# Patient Record
Sex: FEMALE | Race: WHITE
[De-identification: names, ages, dates, MRNs, and addresses within clinical notes are randomized per-mention and may not be internally consistent; named-entity substitution may affect disease eponyms.]

---

## 2023-03-06 ENCOUNTER — HOSPITAL ENCOUNTER (EMERGENCY)
Dept: HOSPITAL 33 - ED | Age: 28
Discharge: HOME | End: 2023-03-06
Payer: COMMERCIAL

## 2023-03-06 VITALS — DIASTOLIC BLOOD PRESSURE: 89 MMHG | SYSTOLIC BLOOD PRESSURE: 127 MMHG

## 2023-03-06 VITALS — HEART RATE: 75 BPM | OXYGEN SATURATION: 97 %

## 2023-03-06 DIAGNOSIS — R10.11: ICD-10-CM

## 2023-03-06 DIAGNOSIS — R11.0: ICD-10-CM

## 2023-03-06 DIAGNOSIS — R10.13: ICD-10-CM

## 2023-03-06 DIAGNOSIS — R79.89: ICD-10-CM

## 2023-03-06 DIAGNOSIS — K80.20: Primary | ICD-10-CM

## 2023-03-06 LAB
ALBUMIN SERPL-MCNC: 4.3 G/DL (ref 3.5–5)
ALP SERPL-CCNC: 408 U/L (ref 38–126)
ALT SERPL-CCNC: 608 U/L (ref 0–35)
AMYLASE SERPL-CCNC: 101 U/L (ref 30–110)
ANION GAP SERPL CALC-SCNC: 10.9 MEQ/L (ref 5–15)
AST SERPL QL: 964 U/L (ref 14–36)
BACTERIA UR CULT: NO
BASOPHILS # BLD AUTO: 0.02 X10^3/UL (ref 0–0.4)
BASOPHILS NFR BLD AUTO: 0.3 % (ref 0–0.4)
BILIRUB BLD-MCNC: 1.6 MG/DL (ref 0.2–1.3)
BUN SERPL-MCNC: 10 MG/DL (ref 7–17)
CALCIUM SPEC-MCNC: 9.2 MG/DL (ref 8.4–10.2)
CHLORIDE SERPL-SCNC: 107 MMOL/L (ref 98–107)
CO2 SERPL-SCNC: 27 MMOL/L (ref 22–30)
CREAT SERPL-MCNC: 0.84 MG/DL (ref 0.52–1.04)
EOSINOPHIL # BLD AUTO: 0.12 X10^3/UL (ref 0–0.5)
GFR SERPLBLD BASED ON 1.73 SQ M-ARVRAT: > 60 ML/MIN
GLUCOSE SERPL-MCNC: 99 MG/DL (ref 74–106)
HCT VFR BLD AUTO: 42.2 % (ref 35–47)
HGB BLD-MCNC: 13.7 G/DL (ref 12–16)
IMM GRANULOCYTES # BLD: 0.02 X10^3U/L (ref 0–0.03)
IMM GRANULOCYTES NFR BLD: 0.3 % (ref 0–0.4)
LIPASE SERPL-CCNC: 299 U/L (ref 23–300)
LYMPHOCYTES # SPEC AUTO: 1.47 X10^3/UL (ref 1–4.6)
MCH RBC QN AUTO: 28.5 PG (ref 26–32)
MCHC RBC AUTO-ENTMCNC: 32.5 G/DL (ref 32–36)
MONOCYTES # BLD AUTO: 0.55 X10^3/UL (ref 0–1.3)
NRBC # BLD AUTO: 0 X10^3U/L (ref 0–0.01)
NRBC BLD AUTO-RTO: 0 % (ref 0–0.1)
PLATELET # BLD AUTO: 226 X10^3/UL (ref 150–450)
POTASSIUM SERPLBLD-SCNC: 3.9 MMOL/L (ref 3.5–5.1)
PROT SERPL-MCNC: 7.2 G/DL (ref 6.3–8.2)
RBC # BLD AUTO: 4.81 X10^6/UL (ref 4.1–5.4)
RBC # URNS HPF: (no result) /HPF (ref 0–5)
SODIUM SERPL-SCNC: 141 MMOL/L (ref 137–145)
WBC # BLD AUTO: 7.1 X10^3/UL (ref 4–10.5)
WBC URNS QL MICRO: (no result) /HPF (ref 0–5)

## 2023-03-06 PROCEDURE — 76705 ECHO EXAM OF ABDOMEN: CPT

## 2023-03-06 PROCEDURE — 83690 ASSAY OF LIPASE: CPT

## 2023-03-06 PROCEDURE — 82150 ASSAY OF AMYLASE: CPT

## 2023-03-06 PROCEDURE — 85025 COMPLETE CBC W/AUTO DIFF WBC: CPT

## 2023-03-06 PROCEDURE — 80053 COMPREHEN METABOLIC PANEL: CPT

## 2023-03-06 PROCEDURE — 99284 EMERGENCY DEPT VISIT MOD MDM: CPT

## 2023-03-06 PROCEDURE — 81001 URINALYSIS AUTO W/SCOPE: CPT

## 2023-03-06 PROCEDURE — 84703 CHORIONIC GONADOTROPIN ASSAY: CPT

## 2023-03-06 PROCEDURE — 96360 HYDRATION IV INFUSION INIT: CPT

## 2023-03-06 PROCEDURE — 36000 PLACE NEEDLE IN VEIN: CPT

## 2023-03-06 PROCEDURE — 74176 CT ABD & PELVIS W/O CONTRAST: CPT

## 2023-03-06 PROCEDURE — 36415 COLL VENOUS BLD VENIPUNCTURE: CPT

## 2023-03-06 NOTE — XRAY
Indication: Pain.  Elevated liver enzymes.



Two-dimensional gallbladder sonogram performed.



Comparison: None



Gallbladder mildly distended with multiple small calculi/gravel.  No abnormal

gallbladder wall thickening or pericholecystic fluid.  Common bile duct

measures 5.3 mm.  Remaining visualized liver, pancreas, and right kidney are

sonographically normal.  Right kidney measures 10.6 cm in length and

sonographically unremarkable.  No free fluid.



Impression: Distended gallbladder with cholelithiasis.  Negative for acute

cholecystitis or biliary distention.  Rule out chronic cholecystitis.

## 2023-03-06 NOTE — XRAY
Indication: Epigastric pain.



Multiple contiguous axial images obtained through the abdomen and pelvis

without contrast.



Comparison: None



Lung bases clear.  Heart not enlarged.



Stomach is distended with food.  Gallbladder moderately distended without

gallstones or biliary distention.  Noncontrasted stomach and bowel loops

appear nonobstructed with normal appendix.  Mild diffuse scattered colonic

fecal debris throughout.  Nonobstructing left renal punctate calculus.  No

free fluid/air.



Remaining liver, gallbladder, pancreas, spleen, adrenal glands, kidneys,

ureters, bladder, uterus, and aorta are unremarkable for noncontrast exam.



Osseous structures intact.



Impression:

1.  Mild distended gallbladder, abnormal in this postprandial patient.

2.  Nonobstructing left renal punctate calculus.

## 2023-03-06 NOTE — ERPHSYRPT
- History of Present Illness


Time Seen by Provider: 03/06/23 07:51


Historian: patient


Exam Limitations: no limitations


Physician History: 





This is a 27-year-old white female who is approximately 5 weeks postpartum and 

presents with intermittent right upper quadrant, epigastric abdominal pain with 

radiation into the back after food consumption.  Patient became concerned 

because at approximately 1030 last evening the pain that she was experiencing 

was more intense and was more constant than typical.  Now, at the time of this 

evaluation, the patient states the pain is not too bad.  The nausea is not too 

bad.  Patient is breast-feeding and does not want any narcotics, pain medicine, 

or antiemetics at this time.  She denies chest pain.  She denies shortness of 

breath.  She has not had a fever.  She has nausea but no vomiting.  She has not 

had diarrhea.


Timing/Duration: week(s) (A few weeks), intermittent, worse


Activities at Onset: none


Quality: sharpness, stabbing


Abdominal Pain Onset Location: RUQ, epigastric


Pain Radiation: back (Right back)


Severity of Pain-Max: moderate


Severity of Pain-Current: mild


Modifying Factors: Improves With: nothing


Associated Symptoms: nausea, No chest pain, No fever/chills, No shortness of 

breath, No vomiting


Previous symptoms: same symptoms as today, no recent treatment


Allergies/Adverse Reactions: 








No Known Drug Allergies Allergy (Verified 03/06/23 07:57)


   





Home Medications: 








Pnv No.95/Ferrous Fum/Folic AC [Prenatal Vitamin Tablet] 1 tab PO DAILY 03/06/23

[History]








Travel Risk





- International Travel


Have you traveled outside of the country in past 3 weeks: No





- Coronavirus Screening


Are you exhibiting any of the following symptoms?: No


Close contact with a COVID-19 positive Pt in past 14-21 Days: No





- Review of Systems


Constitutional: No Symptoms


Eyes: No Symptoms


Ears, Nose, & Throat: No Symptoms


Respiratory: No Symptoms


Cardiac: No Symptoms


Abdominal/Gastrointestinal: Abdominal Pain, Nausea, No Vomiting, No Diarrhea, No

 Constipation


Genitourinary Symptoms: No Symptoms


Musculoskeletal: Back Pain


Skin: No Symptoms


Neurological: No Symptoms


Psychological: No Symptoms


Endocrine: No Symptoms


Hematologic/Lymphatic: No Symptoms


Immunological/Allergic: No Symptoms


All Other Systems: Reviewed and Negative





- Past Medical History


Pertinent Past Medical History: Yes





- Past Surgical History


Past Surgical History: Yes





- Nursing Vital Signs


Nursing Vital Signs: 


                               Initial Vital Signs











Temperature  97 F   03/06/23 07:58


 


Pulse Rate  78   03/06/23 07:58


 


Respiratory Rate  17   03/06/23 07:58


 


Blood Pressure  125/98   03/06/23 07:58


 


O2 Sat by Pulse Oximetry  98   03/06/23 07:58








                                   Pain Scale











Pain Intensity                 6

















- Physical Exam


General Appearance: no apparent distress, alert, anxiety


Eye Exam: PERRL/EOMI, eyes nml inspection


Ears, Nose, Throat Exam: normal ENT inspection, moist mucous membranes


Neck Exam: normal inspection, non-tender, supple, full range of motion


Respiratory Exam: normal breath sounds, lungs clear, No chest tenderness, No 

respiratory distress


Cardiovascular Exam: regular rate/rhythm, normal heart sounds, normal peripheral

 pulses


Gastrointestinal/Abdomen Exam: soft, normal bowel sounds, tenderness (Mild right

 upper quadrant and epigastric), guarding (Mild right upper quadrant to pa

lpation), No rebound


Pelvic Exam: not done


Rectal Exam: not done


Back Exam: normal inspection, normal range of motion, No CVA tenderness, No 

vertebral tenderness


Extremity Exam: normal inspection, normal range of motion, pelvis stable


Neurologic Exam: alert, oriented x 3, cooperative, CNs II-XII nml as tested, 

normal mood/affect, nml cerebellar function, nml station & gait, sensation nml


Skin Exam: normal color, warm, dry


Lymphatic Exam: No adenopathy


**SpO2 Interpretation**: normal


O2 Delivery: Room Air





- Course


Nursing assessment & vital signs reviewed: Yes


Ordered Tests: 


                               Active Orders 24 hr











 Category Date Time Status


 


 IV Insertion STAT Care  03/06/23 08:14 Active


 


 ABDOMEN AND PELVIS W/0 CONTRAS [CT] Stat Exams  03/06/23 08:14 Completed


 


 GALLBLADDER [US] Stat Exams  03/06/23 08:55 Completed


 


 AMYLASE Stat Lab  03/06/23 08:15 Completed


 


 CBC W DIFF Stat Lab  03/06/23 08:15 Completed


 


 CMP Stat Lab  03/06/23 08:15 Completed


 


 HCG QUALITATIVE,SERUM Stat Lab  03/06/23 08:15 Completed


 


 LIPASE Stat Lab  03/06/23 08:15 Completed


 


 UA W/RFX UR CULTURE Stat Lab  03/06/23 08:17 Completed








Medication Summary














Discontinued Medications














Generic Name Dose Route Start Last Admin





  Trade Name Freq  PRN Reason Stop Dose Admin


 


Sodium Chloride  1,000 mls @ 999 mls/hr  03/06/23 08:14  03/06/23 09:37





  Sodium Chloride 0.9% 1000 Ml  IV  03/06/23 09:14  Infused





  .Q1H1M STA   Infusion


 


Sodium Chloride  Confirm  03/06/23 08:18 





  Sodium Chloride 0.9% 1000 Ml  Administered  03/06/23 08:19 





  Dose  





  1,000 mls @   





  .ROUTE  





  .[facility]  











Lab/Rad Data: 


                           Laboratory Result Diagrams





                                 03/06/23 08:15 





                                 03/06/23 08:15 





                               Laboratory Results











  03/06/23 03/06/23 03/06/23 Range/Units





  08:17 08:15 08:15 


 


WBC     (4.0-10.5)  x10^3/uL


 


RBC     (4.1-5.4)  x10^6/uL


 


Hgb     (12.0-16.0)  g/dL


 


Hct     (35-47)  %


 


MCV     ()  fL


 


MCH     (26-32)  pg


 


MCHC     (32-36)  g/dL


 


RDW     (11.5-14.0)  %


 


Plt Count     (150-450)  x10^3/uL


 


MPV     (7.5-11.0)  fL


 


Gran %     (36.0-66.0)  %


 


Immature Gran % (Auto)     (0.00-0.4)  %


 


Nucleat RBC Rel Count     (0.00-0.1)  %


 


Eos # (Auto)     (0-0.5)  x10^3/uL


 


Immature Gran # (Auto)     (0.00-0.03)  x10^3u/L


 


Absolute Lymphs (auto)     (1.0-4.6)  x10^3/uL


 


Absolute Monos (auto)     (0.0-1.3)  x10^3/uL


 


Absolute Nucleated RBC     (0.00-0.01)  x10^3u/L


 


Lymphocytes %     (24.0-44.0)  %


 


Monocytes %     (0.0-12.0)  %


 


Eosinophils %     (0.00-5.0)  %


 


Basophils %     (0.0-0.4)  %


 


Absolute Granulocytes     (1.4-6.9)  x10^3/uL


 


Basophils #     (0-0.4)  x10^3/uL


 


Sodium    141  (137-145)  mmol/L


 


Potassium    3.9  (3.5-5.1)  mmol/L


 


Chloride    107  ()  mmol/L


 


Carbon Dioxide    27  (22-30)  mmol/L


 


Anion Gap    10.9  (5-15)  MEQ/L


 


BUN    10  (7-17)  mg/dL


 


Creatinine    0.84  (0.52-1.04)  mg/dL


 


Estimated GFR    > 60.0  ML/MIN


 


Glucose    99  ()  mg/dL


 


Calcium    9.2  (8.4-10.2)  mg/dL


 


Total Bilirubin    1.60 H  (0.2-1.3)  mg/dL


 


AST    964 H  (14-36)  U/L


 


ALT    608 H  (0-35)  U/L


 


Alkaline Phosphatase    408 H  ()  U/L


 


Serum Total Protein    7.2  (6.3-8.2)  g/dL


 


Albumin    4.3  (3.5-5.0)  g/dL


 


Amylase    101  ()  U/L


 


Lipase    299  ()  U/L


 


Serum Pregnancy, Qual   NEGATIVE   (Negative)  


 


Urine Color  Dark Yellow A    (Yellow)  


 


Urine Appearance  Clear    (Clear)  


 


Urine pH  6.5    (4.6-8.0)  


 


Ur Specific Gravity  1.015    (1.005-1.030)  


 


Urine Protein  Negative    (Negative)  


 


Urine Glucose (UA)  Negative    (Negative)  mg/dL


 


Urine Ketones  Negative    (Negative)  


 


Urine Blood  Negative    (Negative)  


 


Urine Nitrite  Negative    (Negative)  


 


Urine Bilirubin  Negative    (Negative)  


 


Urine Urobilinogen  1.0 A    (0.2)  mg/dL


 


Ur Leukocyte Esterase  Small A    (Negative)  


 


U Hyaline Cast (Auto)  NONE SEEN    (0-2)  /LPF


 


Urine Microscopic RBC  0-2    (0-5)  /HPF


 


Urine Microscopic WBC  0-2    (0-5)  /HPF


 


Ur Epithelial Cells  None Seen    (None Seen)  /HPF


 


Urine Bacteria  None Seen    (None Seen)  /HPF


 


Urine Culture Reflexed  NO    (NO)  














  03/06/23 Range/Units





  08:15 


 


WBC  7.1  (4.0-10.5)  x10^3/uL


 


RBC  4.81  (4.1-5.4)  x10^6/uL


 


Hgb  13.7  (12.0-16.0)  g/dL


 


Hct  42.2  (35-47)  %


 


MCV  87.7  ()  fL


 


MCH  28.5  (26-32)  pg


 


MCHC  32.5  (32-36)  g/dL


 


RDW  11.9  (11.5-14.0)  %


 


Plt Count  226  (150-450)  x10^3/uL


 


MPV  10.1  (7.5-11.0)  fL


 


Gran %  69.0 H  (36.0-66.0)  %


 


Immature Gran % (Auto)  0.3  (0.00-0.4)  %


 


Nucleat RBC Rel Count  0.0  (0.00-0.1)  %


 


Eos # (Auto)  0.12  (0-0.5)  x10^3/uL


 


Immature Gran # (Auto)  0.02  (0.00-0.03)  x10^3u/L


 


Absolute Lymphs (auto)  1.47  (1.0-4.6)  x10^3/uL


 


Absolute Monos (auto)  0.55  (0.0-1.3)  x10^3/uL


 


Absolute Nucleated RBC  0.00  (0.00-0.01)  x10^3u/L


 


Lymphocytes %  20.9 L  (24.0-44.0)  %


 


Monocytes %  7.8  (0.0-12.0)  %


 


Eosinophils %  1.7  (0.00-5.0)  %


 


Basophils %  0.3  (0.0-0.4)  %


 


Absolute Granulocytes  4.87  (1.4-6.9)  x10^3/uL


 


Basophils #  0.02  (0-0.4)  x10^3/uL


 


Sodium   (137-145)  mmol/L


 


Potassium   (3.5-5.1)  mmol/L


 


Chloride   ()  mmol/L


 


Carbon Dioxide   (22-30)  mmol/L


 


Anion Gap   (5-15)  MEQ/L


 


BUN   (7-17)  mg/dL


 


Creatinine   (0.52-1.04)  mg/dL


 


Estimated GFR   ML/MIN


 


Glucose   ()  mg/dL


 


Calcium   (8.4-10.2)  mg/dL


 


Total Bilirubin   (0.2-1.3)  mg/dL


 


AST   (14-36)  U/L


 


ALT   (0-35)  U/L


 


Alkaline Phosphatase   ()  U/L


 


Serum Total Protein   (6.3-8.2)  g/dL


 


Albumin   (3.5-5.0)  g/dL


 


Amylase   ()  U/L


 


Lipase   ()  U/L


 


Serum Pregnancy, Qual   (Negative)  


 


Urine Color   (Yellow)  


 


Urine Appearance   (Clear)  


 


Urine pH   (4.6-8.0)  


 


Ur Specific Gravity   (1.005-1.030)  


 


Urine Protein   (Negative)  


 


Urine Glucose (UA)   (Negative)  mg/dL


 


Urine Ketones   (Negative)  


 


Urine Blood   (Negative)  


 


Urine Nitrite   (Negative)  


 


Urine Bilirubin   (Negative)  


 


Urine Urobilinogen   (0.2)  mg/dL


 


Ur Leukocyte Esterase   (Negative)  


 


U Hyaline Cast (Auto)   (0-2)  /LPF


 


Urine Microscopic RBC   (0-5)  /HPF


 


Urine Microscopic WBC   (0-5)  /HPF


 


Ur Epithelial Cells   (None Seen)  /HPF


 


Urine Bacteria   (None Seen)  /HPF


 


Urine Culture Reflexed   (NO)  














- Progress


Progress: improved, pain not gone completely, re-examined


Progress Note: 





03/06/23 09:56


The CAT scan of the abdomen pelvis shows mildly distended gallbladder without 

gallstones or biliary tree distention.  There is a nonobstructive normal 

appendix.


Gallbladder ultrasound shows multiple gallstones that are small within the 

gallbladder.  There is no evidence of common bile duct stones or dilation of the

 biliary tree.  There is no pericholecystic fluid.  There is no gallbladder wall

 thickening.





This patient's medical issue is of moderate complexity.  The work-up performed 

was based on the past medical history, review of the patient's medication and 

allergy list, history of present illness and physical findings on examination.  

The work-up performed was placement of intravenous line and infusion of normal 

saline solution.  Patient refused antiemetics and pain medicine of any kind.  We

 also obtained a urinalysis, pregnancy test, CBC, CMP, amylase and lipase labs. 

 We performed a CT scan of the abdomen pelvis as well as a gallbladder 

ultrasound.  I reviewed the results of the entire work-up.  Patient has elevated

 liver function test.  She also has evidence of cholelithiasis.  Her diagnosis 

is symptomatic cholelithiasis with elevated liver function test.  Her pancreas 

enzymes are normal.  Patient needs referral back to her primary care physician 

ultimately to a general surgeon.  The discharge plan is to discharge her to 

home, place her on a clear liquid diet and avoid fatty greasy spicy foods.  She 

may use Tylenol and ibuprofen for pain control.


Counseled pt/family regarding: lab results, diagnosis, need for follow-up, rad 

results





Medical Desision Making





- Discussion of managment


Reviewed:: Test results, Need for additional workup


Agreed on:: Treatment plan, need for follow-up





- Diagnostic Testing


Diagnostic test were ordered, analyzed, and reviewed by me: Yes


Radiological Interpretation: Reviewed by me, Teleradiologist Report





- Risk of complications


The pt has a mod risk of morbidity or mortality based on: Need for major surgery

 in otherwise healthy patient (Nonemergent)





- Departure


Departure Disposition: Home


Clinical Impression: 


 Symptomatic cholelithiasis, Elevated liver function tests





Condition: Stable


Critical Care Time: No


Additional Instructions: 


Avoid fatty greasy spicy foods.  Clear liquid diet.  Call your primary care 

physician or general surgeon of your choice to obtain an appointment for further

 evaluation management.  May use Tylenol and ibuprofen for pain control.


Prescriptions: 


Ondansetron ODT 4 MG*** [Zofran Odt 4 mg***] 4 mg PO Q6H PRN PRN #10 tablet


 PRN Reason: Vomiting